# Patient Record
Sex: FEMALE | NOT HISPANIC OR LATINO | URBAN - METROPOLITAN AREA
[De-identification: names, ages, dates, MRNs, and addresses within clinical notes are randomized per-mention and may not be internally consistent; named-entity substitution may affect disease eponyms.]

---

## 2018-02-16 ENCOUNTER — INPATIENT (INPATIENT)
Facility: HOSPITAL | Age: 68
LOS: 1 days | Discharge: HOME | End: 2018-02-18
Attending: INTERNAL MEDICINE | Admitting: INTERNAL MEDICINE
Payer: MEDICARE

## 2018-02-16 VITALS
OXYGEN SATURATION: 100 % | SYSTOLIC BLOOD PRESSURE: 133 MMHG | HEART RATE: 66 BPM | DIASTOLIC BLOOD PRESSURE: 78 MMHG | RESPIRATION RATE: 16 BRPM | TEMPERATURE: 98 F | WEIGHT: 134.92 LBS | HEIGHT: 64 IN

## 2018-02-16 LAB
ALBUMIN SERPL ELPH-MCNC: 3.6 G/DL — SIGNIFICANT CHANGE UP (ref 3.3–5)
ALP SERPL-CCNC: 52 U/L — SIGNIFICANT CHANGE UP (ref 40–120)
ALT FLD-CCNC: 21 U/L — SIGNIFICANT CHANGE UP (ref 12–78)
ANION GAP SERPL CALC-SCNC: 6 MMOL/L — SIGNIFICANT CHANGE UP (ref 5–17)
APTT BLD: 25.9 SEC — LOW (ref 27.5–37.4)
AST SERPL-CCNC: 21 U/L — SIGNIFICANT CHANGE UP (ref 15–37)
BASOPHILS # BLD AUTO: 0.1 K/UL — SIGNIFICANT CHANGE UP (ref 0–0.2)
BASOPHILS NFR BLD AUTO: 1 % — SIGNIFICANT CHANGE UP (ref 0–2)
BILIRUB SERPL-MCNC: 0.2 MG/DL — SIGNIFICANT CHANGE UP (ref 0.2–1.2)
BUN SERPL-MCNC: 15 MG/DL — SIGNIFICANT CHANGE UP (ref 7–23)
CALCIUM SERPL-MCNC: 8.9 MG/DL — SIGNIFICANT CHANGE UP (ref 8.5–10.1)
CHLORIDE SERPL-SCNC: 104 MMOL/L — SIGNIFICANT CHANGE UP (ref 96–108)
CO2 SERPL-SCNC: 30 MMOL/L — SIGNIFICANT CHANGE UP (ref 22–31)
CREAT SERPL-MCNC: 0.78 MG/DL — SIGNIFICANT CHANGE UP (ref 0.5–1.3)
EOSINOPHIL # BLD AUTO: 0.2 K/UL — SIGNIFICANT CHANGE UP (ref 0–0.5)
EOSINOPHIL NFR BLD AUTO: 2.6 % — SIGNIFICANT CHANGE UP (ref 0–6)
GLUCOSE SERPL-MCNC: 109 MG/DL — HIGH (ref 70–99)
HCT VFR BLD CALC: 39.6 % — SIGNIFICANT CHANGE UP (ref 34.5–45)
HGB BLD-MCNC: 12.5 G/DL — SIGNIFICANT CHANGE UP (ref 11.5–15.5)
INR BLD: 0.95 RATIO — SIGNIFICANT CHANGE UP (ref 0.88–1.16)
LYMPHOCYTES # BLD AUTO: 1.3 K/UL — SIGNIFICANT CHANGE UP (ref 1–3.3)
LYMPHOCYTES # BLD AUTO: 18.8 % — SIGNIFICANT CHANGE UP (ref 13–44)
MCHC RBC-ENTMCNC: 25.4 PG — LOW (ref 27–34)
MCHC RBC-ENTMCNC: 31.6 GM/DL — LOW (ref 32–36)
MCV RBC AUTO: 80.6 FL — SIGNIFICANT CHANGE UP (ref 80–100)
MONOCYTES # BLD AUTO: 0.5 K/UL — SIGNIFICANT CHANGE UP (ref 0–0.9)
MONOCYTES NFR BLD AUTO: 6.9 % — SIGNIFICANT CHANGE UP (ref 2–14)
NEUTROPHILS # BLD AUTO: 4.9 K/UL — SIGNIFICANT CHANGE UP (ref 1.8–7.4)
NEUTROPHILS NFR BLD AUTO: 70.8 % — SIGNIFICANT CHANGE UP (ref 43–77)
PLATELET # BLD AUTO: 320 K/UL — SIGNIFICANT CHANGE UP (ref 150–400)
POTASSIUM SERPL-MCNC: 4.1 MMOL/L — SIGNIFICANT CHANGE UP (ref 3.5–5.3)
POTASSIUM SERPL-SCNC: 4.1 MMOL/L — SIGNIFICANT CHANGE UP (ref 3.5–5.3)
PROT SERPL-MCNC: 7.2 GM/DL — SIGNIFICANT CHANGE UP (ref 6–8.3)
PROTHROM AB SERPL-ACNC: 10.3 SEC — SIGNIFICANT CHANGE UP (ref 9.8–12.7)
RBC # BLD: 4.92 M/UL — SIGNIFICANT CHANGE UP (ref 3.8–5.2)
RBC # FLD: 12.8 % — SIGNIFICANT CHANGE UP (ref 10.3–14.5)
SODIUM SERPL-SCNC: 140 MMOL/L — SIGNIFICANT CHANGE UP (ref 135–145)
TROPONIN I SERPL-MCNC: <0.015 NG/ML — SIGNIFICANT CHANGE UP (ref 0.01–0.04)
WBC # BLD: 7 K/UL — SIGNIFICANT CHANGE UP (ref 3.8–10.5)
WBC # FLD AUTO: 7 K/UL — SIGNIFICANT CHANGE UP (ref 3.8–10.5)

## 2018-02-16 PROCEDURE — 99285 EMERGENCY DEPT VISIT HI MDM: CPT

## 2018-02-16 PROCEDURE — 71046 X-RAY EXAM CHEST 2 VIEWS: CPT | Mod: 26

## 2018-02-16 PROCEDURE — 93010 ELECTROCARDIOGRAM REPORT: CPT

## 2018-02-16 RX ORDER — ASPIRIN/CALCIUM CARB/MAGNESIUM 324 MG
325 TABLET ORAL ONCE
Qty: 0 | Refills: 0 | Status: COMPLETED | OUTPATIENT
Start: 2018-02-16 | End: 2018-02-16

## 2018-02-16 RX ORDER — SODIUM CHLORIDE 9 MG/ML
3 INJECTION INTRAMUSCULAR; INTRAVENOUS; SUBCUTANEOUS ONCE
Qty: 0 | Refills: 0 | Status: COMPLETED | OUTPATIENT
Start: 2018-02-16 | End: 2018-02-16

## 2018-02-16 RX ADMIN — Medication 325 MILLIGRAM(S): at 22:18

## 2018-02-16 RX ADMIN — SODIUM CHLORIDE 3 MILLILITER(S): 9 INJECTION INTRAMUSCULAR; INTRAVENOUS; SUBCUTANEOUS at 22:18

## 2018-02-16 NOTE — ED PROVIDER NOTE - OBJECTIVE STATEMENT
66 y/o F w/ pmhx presents to ED c/o syncope. Pt states she felt weak after going out to dinner and was told by family she lost consciousness. Pt has hx of multiple syncopal episodes with same sx, states last episode was 7 months ago and has had "a dozen" episodes over the last 40 years. Pt reports she has had cardiac cath test for syncopal episodes which was negative. Pt has no complaints at time of evaluation. 68 y/o female w/ no significant known pmhx presents to ED c/o syncope. Pt states she felt weak after going out to dinner and was told by family she lost consciousness. Pt has hx of multiple syncopal episodes with same sx, states last episode was 7 months ago and has had "a dozen" episodes over the last 40 years. No cp, sob or palpitation. No fever or chills. No rash. No melena or hematochezia. Syncope happened without a prodrome.

## 2018-02-16 NOTE — ED ADULT NURSE NOTE - CHPI ED SYMPTOMS NEG
no chills/no syncope/no diaphoresis/no shortness of breath/no vomiting/no fever/no chest pain/no dizziness/no back pain/no cough/no nausea

## 2018-02-16 NOTE — ED PROVIDER NOTE - NS_ ATTENDINGSCRIBEDETAILS _ED_A_ED_FT
I Alfredo Travis MD saw and examined the patient. Scribe documented for me and under my supervision. I have modified the scribe's documentation where necessary to reflect my history, physical exam and other relevant documentations pertinent to the care of the patient.

## 2018-02-16 NOTE — ED PROVIDER NOTE - MUSCULOSKELETAL, MLM
Spine appears normal, range of motion is not limited, no muscle or joint tenderness Spine appears normal, range of motion is not limited, no muscle or joint tenderness. 5/5 strength on flexion and extension of all muscles. No nuchal rigidity.

## 2018-02-16 NOTE — ED PROVIDER NOTE - RESPIRATORY, MLM
Breath sounds clear and equal bilaterally. Breath sounds clear and equal bilaterally. No tachypnea or retractions.

## 2018-02-16 NOTE — ED PROVIDER NOTE - MEDICAL DECISION MAKING DETAILS
Patient admitted for further inpatient evaluation for syncope without prodrome. Hospitalist care and admission of this patient is appreciated. Prior to admission I had a full discussion with patient and family and provided her with the results of labs and imaging.

## 2018-02-16 NOTE — ED PROVIDER NOTE - NEUROLOGICAL, MLM
Alert and oriented, no focal deficits, no motor or sensory deficits. Alert and oriented, no focal deficits, no motor or sensory deficits. CNs 2-12 grossly intact. EOMI. Visual fields intact x 4 quadrants. Ambulatory. No limb ataxia. NIH=0

## 2018-02-16 NOTE — ED ADULT NURSE NOTE - OBJECTIVE STATEMENT
pt is 66 yo female pmhx bilateral mastectomy presents to er via ems s/p syncopal episode.  pt states she was sitting, eating at the restaurant, passed out, denies falling to floor.

## 2018-02-17 DIAGNOSIS — Z98.890 OTHER SPECIFIED POSTPROCEDURAL STATES: Chronic | ICD-10-CM

## 2018-02-17 DIAGNOSIS — R55 SYNCOPE AND COLLAPSE: ICD-10-CM

## 2018-02-17 DIAGNOSIS — Z29.9 ENCOUNTER FOR PROPHYLACTIC MEASURES, UNSPECIFIED: ICD-10-CM

## 2018-02-17 DIAGNOSIS — E89.0 POSTPROCEDURAL HYPOTHYROIDISM: ICD-10-CM

## 2018-02-17 DIAGNOSIS — E89.0 POSTPROCEDURAL HYPOTHYROIDISM: Chronic | ICD-10-CM

## 2018-02-17 DIAGNOSIS — C50.919 MALIGNANT NEOPLASM OF UNSPECIFIED SITE OF UNSPECIFIED FEMALE BREAST: ICD-10-CM

## 2018-02-17 LAB
ALBUMIN SERPL ELPH-MCNC: 3.1 G/DL — LOW (ref 3.3–5)
ALP SERPL-CCNC: 48 U/L — SIGNIFICANT CHANGE UP (ref 40–120)
ALT FLD-CCNC: 18 U/L — SIGNIFICANT CHANGE UP (ref 12–78)
ANION GAP SERPL CALC-SCNC: 6 MMOL/L — SIGNIFICANT CHANGE UP (ref 5–17)
AST SERPL-CCNC: 15 U/L — SIGNIFICANT CHANGE UP (ref 15–37)
BILIRUB SERPL-MCNC: 0.2 MG/DL — SIGNIFICANT CHANGE UP (ref 0.2–1.2)
BUN SERPL-MCNC: 18 MG/DL — SIGNIFICANT CHANGE UP (ref 7–23)
CALCIUM SERPL-MCNC: 8.3 MG/DL — LOW (ref 8.5–10.1)
CALCIUM SERPL-MCNC: 8.3 MG/DL — LOW (ref 8.5–10.1)
CHLORIDE SERPL-SCNC: 111 MMOL/L — HIGH (ref 96–108)
CO2 SERPL-SCNC: 26 MMOL/L — SIGNIFICANT CHANGE UP (ref 22–31)
CREAT SERPL-MCNC: 0.68 MG/DL — SIGNIFICANT CHANGE UP (ref 0.5–1.3)
GLUCOSE SERPL-MCNC: 94 MG/DL — SIGNIFICANT CHANGE UP (ref 70–99)
HCV AB S/CO SERPL IA: 0.12 S/CO — SIGNIFICANT CHANGE UP
HCV AB SERPL-IMP: SIGNIFICANT CHANGE UP
INR BLD: 0.98 RATIO — SIGNIFICANT CHANGE UP (ref 0.88–1.16)
MAGNESIUM SERPL-MCNC: 1.9 MG/DL — SIGNIFICANT CHANGE UP (ref 1.6–2.6)
PHOSPHATE SERPL-MCNC: 3.9 MG/DL — SIGNIFICANT CHANGE UP (ref 2.5–4.5)
POTASSIUM SERPL-MCNC: 4.1 MMOL/L — SIGNIFICANT CHANGE UP (ref 3.5–5.3)
POTASSIUM SERPL-SCNC: 4.1 MMOL/L — SIGNIFICANT CHANGE UP (ref 3.5–5.3)
PROT SERPL-MCNC: 6.2 GM/DL — SIGNIFICANT CHANGE UP (ref 6–8.3)
PROTHROM AB SERPL-ACNC: 10.6 SEC — SIGNIFICANT CHANGE UP (ref 9.8–12.7)
SODIUM SERPL-SCNC: 143 MMOL/L — SIGNIFICANT CHANGE UP (ref 135–145)
TROPONIN I SERPL-MCNC: <0.015 NG/ML — SIGNIFICANT CHANGE UP (ref 0.01–0.04)
TSH SERPL-MCNC: 2.33 UU/ML — SIGNIFICANT CHANGE UP (ref 0.36–3.74)

## 2018-02-17 PROCEDURE — 93880 EXTRACRANIAL BILAT STUDY: CPT | Mod: 26

## 2018-02-17 PROCEDURE — 93306 TTE W/DOPPLER COMPLETE: CPT | Mod: 26

## 2018-02-17 PROCEDURE — 70450 CT HEAD/BRAIN W/O DYE: CPT | Mod: 26

## 2018-02-17 RX ORDER — ONDANSETRON 8 MG/1
4 TABLET, FILM COATED ORAL EVERY 6 HOURS
Qty: 0 | Refills: 0 | Status: DISCONTINUED | OUTPATIENT
Start: 2018-02-17 | End: 2018-02-17

## 2018-02-17 RX ORDER — HEPARIN SODIUM 5000 [USP'U]/ML
5000 INJECTION INTRAVENOUS; SUBCUTANEOUS EVERY 8 HOURS
Qty: 0 | Refills: 0 | Status: DISCONTINUED | OUTPATIENT
Start: 2018-02-17 | End: 2018-02-17

## 2018-02-17 RX ORDER — SODIUM CHLORIDE 9 MG/ML
1000 INJECTION INTRAMUSCULAR; INTRAVENOUS; SUBCUTANEOUS
Qty: 0 | Refills: 0 | Status: DISCONTINUED | OUTPATIENT
Start: 2018-02-17 | End: 2018-02-17

## 2018-02-17 RX ORDER — ACETAMINOPHEN 500 MG
650 TABLET ORAL EVERY 8 HOURS
Qty: 0 | Refills: 0 | Status: DISCONTINUED | OUTPATIENT
Start: 2018-02-17 | End: 2018-02-17

## 2018-02-17 RX ORDER — LEVOTHYROXINE SODIUM 125 MCG
25 TABLET ORAL DAILY
Qty: 0 | Refills: 0 | Status: DISCONTINUED | OUTPATIENT
Start: 2018-02-17 | End: 2018-02-18

## 2018-02-17 RX ORDER — LEVOTHYROXINE SODIUM 125 MCG
1 TABLET ORAL
Qty: 0 | Refills: 0 | COMMUNITY

## 2018-02-17 RX ADMIN — SODIUM CHLORIDE 100 MILLILITER(S): 9 INJECTION INTRAMUSCULAR; INTRAVENOUS; SUBCUTANEOUS at 05:05

## 2018-02-17 RX ADMIN — Medication 25 MICROGRAM(S): at 05:05

## 2018-02-17 NOTE — H&P ADULT - NSHPPHYSICALEXAM_GEN_ALL_CORE
Vital Signs  T(C): 36.9 (16 Feb 2018 21:05), Max: 36.9 (16 Feb 2018 21:05)  T(F): 98.4 (16 Feb 2018 21:05), Max: 98.4 (16 Feb 2018 21:05)  HR: 66 (16 Feb 2018 21:05) (66 - 66)  BP: 133/78 (16 Feb 2018 21:05) (133/78 - 133/78)  RR: 16 (16 Feb 2018 21:05) (16 - 16)  SpO2: 100% (16 Feb 2018 21:05) (100% - 100%)    PHYSICAL EXAM:  Constitutional: NAD, sleeping but easily arousable, well-developed  HEENT: EOMI, Normal Hearing, MMM  Neck: Soft and supple, No LAD  Respiratory: Breath sounds are clear bilaterally, No wheezing, rales or rhonchi  Cardiovascular: S1 and S2, regular rate and rhythm  Gastrointestinal: Bowel Sounds present, soft, nontender, nondistended, no guarding, no rebound  Extremities: No peripheral edema  Vascular: 2+ peripheral pulses  Neurological: A/O x 3, no focal deficits  Musculoskeletal: 5/5 strength b/l upper and lower extremities  Skin: No rashes

## 2018-02-17 NOTE — H&P ADULT - NSHPLABSRESULTS_GEN_ALL_CORE
12.5   7.0   )-----------( 320      ( 16 Feb 2018 21:46 )             39.6     16 Feb 2018 21:46    140    |  104    |  15     ----------------------------<  109    4.1     |  30     |  0.78     Ca    8.9        16 Feb 2018 21:46    TPro  7.2    /  Alb  3.6    /  TBili  0.2    /  DBili  x      /  AST  21     /  ALT  21     /  AlkPhos  52     16 Feb 2018 21:46    LIVER FUNCTIONS - ( 16 Feb 2018 21:46 )  Alb: 3.6 g/dL / Pro: 7.2 gm/dL / ALK PHOS: 52 U/L / ALT: 21 U/L / AST: 21 U/L / GGT: x           PT/INR - ( 16 Feb 2018 21:46 )   PT: 10.3 sec;   INR: 0.95 ratio         PTT - ( 16 Feb 2018 21:46 )  PTT:25.9 sec  CAPILLARY BLOOD GLUCOSE    CARDIAC MARKERS ( 17 Feb 2018 01:10 )  <0.015 ng/mL / x     / x     / x     / x      CARDIAC MARKERS ( 16 Feb 2018 21:46 )  <0.015 ng/mL / x     / x     / x     / x 12.5   7.0   )-----------( 320      ( 16 Feb 2018 21:46 )             39.6     16 Feb 2018 21:46    140    |  104    |  15     ----------------------------<  109    4.1     |  30     |  0.78     Ca    8.9        16 Feb 2018 21:46    TPro  7.2    /  Alb  3.6    /  TBili  0.2    /  DBili  x      /  AST  21     /  ALT  21     /  AlkPhos  52     16 Feb 2018 21:46    LIVER FUNCTIONS - ( 16 Feb 2018 21:46 )  Alb: 3.6 g/dL / Pro: 7.2 gm/dL / ALK PHOS: 52 U/L / ALT: 21 U/L / AST: 21 U/L / GGT: x           PT/INR - ( 16 Feb 2018 21:46 )   PT: 10.3 sec;   INR: 0.95 ratio       PTT - ( 16 Feb 2018 21:46 )  PTT:25.9 sec  CAPILLARY BLOOD GLUCOSE    CARDIAC MARKERS ( 17 Feb 2018 01:10 )  <0.015 ng/mL / x     / x     / x     / x      CARDIAC MARKERS ( 16 Feb 2018 21:46 )  <0.015 ng/mL / x     / x     / x     / x      CXR:    EKG:

## 2018-02-17 NOTE — CONSULT NOTE ADULT - SUBJECTIVE AND OBJECTIVE BOX
Patient is a 67y old  Female who presents with a chief complaint of I passed out.    HPI:  68yo F w/hx of Breast cancerx2 s/p chemo& radiation and double mastectomy present after a witnessed syncopal episode. Per pt, she passed out after dinner last night and was told by family that it lasted for about a minute. She denies any inciting event (emotional/physical exertion), did not feel N/V, flushing, palpitations, dizziness prior to syncope. Did not have any seizure-like activity loss of bladder/bowel function during her syncope. When she came to, she did not feel confused, have a HA, CP, palpitations or SOB. States weakness and dizziness after coming to. She also states that an ambulance was called this time because she felt weaker than normal and the weakness lasted longer than it normally does. States did not hit her head per witnesses.    Per pt, she has had multiple syncopal episodes over the years. The episodes occur after heavy meals, +aura prior to syncope, she states that she can feel a syncopal episode coming on most of the time prior to its occurrence.  She's normally sitting down when she syncopizes.    States she felt nauseated and vomited on the ambulance.   Currently, pt denies weakness, dizziness, HA, CP, palpitations, SOB, N/V/D/C, numbness/tingling in her extremities    She was given 325mg of Aspirin in the ED.    She denies any more symptoms since presentation.     PMHX: breast cancer  PSHx; Thyroidectomy  Social hx: lives at home w/her . Denies ETOH, cigarettes, illicit drugs  Family hx: heart disease, MI, diabetes, obesity (17 Feb 2018 02:38)      PAST MEDICAL & SURGICAL HISTORY:  Thyroid nodule  Breast cancer  H/O bilateral mastectomy  H/O partial thyroidectomy      MEDICATIONS  (STANDING):  heparin  Injectable 5000 Unit(s) SubCutaneous every 8 hours  levothyroxine 25 MICROGram(s) Oral daily  sodium chloride 0.9%. 1000 milliLiter(s) (100 mL/Hr) IV Continuous <Continuous>    MEDICATIONS  (PRN):  acetaminophen   Tablet. 650 milliGRAM(s) Oral every 8 hours PRN Mild Pain (1 - 3)  ondansetron Injectable 4 milliGRAM(s) IV Push every 6 hours PRN Nausea      FAMILY HISTORY:  Family history of diabetes mellitus in mother (Mother)  Family history of obesity (Mother)  Family history of heart disease (Father, Mother)  Family history of myocardial infarction (Father, Mother)      SOCIAL HISTORY:  non smoker, no alcohol use     REVIEW OF SYSTEMS:  CONSTITUTIONAL:  No night sweats.  No fatigue, malaise, lethargy.  No fever or chills.  HEENT:  Eyes:  No visual changes.  No eye pain.      ENT:  No runny nose.  No epistaxis.  No sinus pain.  No sore throat.  No odynophagia.  No ear pain.  No congestion.  RESPIRATORY:  No cough.  No wheeze.  No hemoptysis.  No shortness of breath.  CARDIOVASCULAR:  No chest pains.  No palpitations. No shortness of breath, No orthopnea or PND, c/o syncope.  GASTROINTESTINAL:  No abdominal pain.  No nausea or vomiting.  No diarrhea or constipation.  No hematemesis.  No hematochezia.  No melena.  GENITOURINARY:  No urgency.  No frequency.  No dysuria.  No hematuria.  No obstructive symptoms.  No discharge.  No pain.  No significant abnormal bleeding.  MUSCULOSKELETAL:  No musculoskeletal pain.  No joint swelling.  No arthritis.  NEUROLOGICAL:  No tingling or numbness or weakness.  PSYCHIATRIC:  No confusion  SKIN:  No rashes.  No lesions.  No wounds.  ENDOCRINE:  No unexplained weight loss.  No polydipsia.  No polyuria.  No polyphagia.  HEMATOLOGIC:  No anemia.  No purpura.  No petechiae.  No prolonged or excessive bleeding.   ALLERGIC AND IMMUNOLOGIC:  No pruritus.  No swelling.         Vital Signs Last 24 Hrs  T(C): 36.3 (17 Feb 2018 05:17), Max: 36.9 (16 Feb 2018 21:05)  T(F): 97.4 (17 Feb 2018 05:17), Max: 98.4 (16 Feb 2018 21:05)  HR: 60 (17 Feb 2018 05:17) (60 - 80)  BP: 108/63 (17 Feb 2018 05:17) (108/63 - 133/78)  BP(mean): --  RR: 16 (17 Feb 2018 05:17) (16 - 16)  SpO2: 98% (17 Feb 2018 03:27) (98% - 100%)    PHYSICAL EXAM-    Constitutional: The patient appears to be normal, well developed, well nourished and alert and oriented to time, place and person. The patient does not appear acutely ill.     Head: Head is normocephalic and atraumatic.      Neck: The patient's neck is supple without enlargement, has no palpable thyromegaly nor thyroid nodules and has no jugular venous distention. No audible carotid bruits. There are strong carotid pulses bilaterally. No JVD.     Cardiovascular: Regular rate and rhythm without S3, S4. No murmurs or rubs are appreciated.      Respiratory: Breath sounds are normal. No rales. No wheezing.    Abdomen: Soft, nontender, nondistended with positive bowel sounds.      Extremity: No tenderness. No  pitting edema No skin discoloration No clubbing No cyanosis.     Neurologic: The patient is alert and oriented.      Skin: No rash, no obvious lesions noted.      Psychiatric: The patient appears to be emotionally stable.      INTERPRETATION OF TELEMETRY: sinus rythm    ECG:sinus rythm ,normal axis, T wave inversion in aVL and V2.     I&O's Detail      LABS:                        12.5   7.0   )-----------( 320      ( 16 Feb 2018 21:46 )             39.6     02-17    143  |  111<H>  |  18  ----------------------------<  94  4.1   |  26  |  0.68    Ca    8.3<L>      17 Feb 2018 06:09  Phos  3.9     02-17  Mg     1.9     02-17    TPro  6.2  /  Alb  3.1<L>  /  TBili  0.2  /  DBili  x   /  AST  15  /  ALT  18  /  AlkPhos  48  02-17    CARDIAC MARKERS ( 17 Feb 2018 01:10 )  <0.015 ng/mL / x     / x     / x     / x      CARDIAC MARKERS ( 16 Feb 2018 21:46 )  <0.015 ng/mL / x     / x     / x     / x          PT/INR - ( 17 Feb 2018 06:09 )   PT: 10.6 sec;   INR: 0.98 ratio         PTT - ( 16 Feb 2018 21:46 )  PTT:25.9 sec    I&O's Summary    BNP  RADIOLOGY & ADDITIONAL STUDIES:

## 2018-02-17 NOTE — H&P ADULT - NSHPREVIEWOFSYSTEMS_GEN_ALL_CORE
REVIEW OF SYSTEMS:  CONSTITUTIONAL: No weakness, No fevers or chills  EYES/ENT: No visual changes;  No vertigo or throat pain   NECK: No pain or stiffness  RESPIRATORY: No cough, wheezing, hemoptysis; No shortness of breath  CARDIOVASCULAR: No chest pain or palpitations  GASTROINTESTINAL: No abdominal or epigastric pain. No nausea, vomiting, or hematemesis; No diarrhea or constipation. No melena or hematochezia.  GENITOURINARY: No dysuria, frequency or hematuria  HEMATOLOGIC: No easy bruising, prolonged bleeding from everyday cuts,   NEUROLOGICAL: No numbness or weakness  PSYCH: Denies Insomnia, loss of interest in activities, feelings of guilt, decreased energy, difficulty concentrating, decreased appetite, SI/HI  SKIN: No itching, burning, rashes, or lesions   All other review of systems is negative unless indicated above

## 2018-02-17 NOTE — H&P ADULT - FAMILY HISTORY
Father  Still living? No  Family history of myocardial infarction, Age at diagnosis: Age Unknown  Family history of heart disease, Age at diagnosis: Age Unknown     Mother  Still living? No  Family history of myocardial infarction, Age at diagnosis: Age Unknown  Family history of heart disease, Age at diagnosis: Age Unknown  Family history of obesity, Age at diagnosis: Age Unknown  Family history of diabetes mellitus in mother, Age at diagnosis: Age Unknown

## 2018-02-17 NOTE — H&P ADULT - ATTENDING COMMENTS
68yo F w/hx of Breast cancerx2 s/p chemo& radiation and double mastectomy present after a witnessed syncopal episode. Per pt, she passed out after dinner last night and was told by family that it lasted for about a minute. She denies any inciting event (emotional/physical exertion), did not feel N/V, flushing, palpitations, dizziness prior to syncope. Did not have any seizure-like activity loss of bladder/bowel function during her syncope.     Per pt, she has had multiple syncopal episodes over the years. The episodes occur after heavy meals, +aura prior to syncope, she states that she can feel a syncopal episode coming on most of the time prior to its occurrence.  She's normally sitting down when she has syncope.    On exam:    Constitutional: NAD, sleeping but easily arousable, well-developed  HEENT: EOMI, Normal Hearing, MMM  Neck: Soft and supple, No LAD  Respiratory: Breath sounds are clear bilaterally, No wheezing, rales or rhonchi  Cardiovascular: S1 and S2, regular rate and rhythm  Gastrointestinal: Bowel Sounds present, soft, nontender, nondistended, no guarding, no rebound  Extremities: No peripheral edema  Vascular: 2+ peripheral pulses  Neurological: A/O x 3, no focal deficits, sensory-intact, cerebellum-intact, CF-DF-Dvc-intact   Musculoskeletal: 5/5 strength b/l upper and lower extremities    Labs and imaging are reviewed.    A/P:    Syncope  Hypothyroidism  Orthostatic Hypotension   -will follow clinically on Telemetry   -get CT head, carotid duplex, Echo  -will get cardiology consult for possible Loop recorder as she has it so many times.   -will give IVF for mild Orthostatic hypotension   -continue levothyroxine  Heparin for DVT px

## 2018-02-17 NOTE — H&P ADULT - ASSESSMENT
68yo F w/hx of Breast cancerx2 s/p chemo& radiation and double mastectomy present after syncopal episode.

## 2018-02-17 NOTE — H&P ADULT - PROBLEM SELECTOR PLAN 1
- # Most likely neurally mediated in the setting of postprandial onset.  - Admit to Tele  - Fall precautions  - orthostatic vitals  - ECHO to r/o structural heart disease  - Urine toxicology  - Consider Head CT in the setting of previous episodes of syncope- there can be ?head trauma # Most likely neurally mediated in the setting of postprandial onset.  - Admit to Tele  - Fall precautions  - orthostatic vitals  - ECHO to r/o structural heart disease  - Urine toxicology  - Consider Head CT in the setting of previous episodes of syncope- there can be ?head trauma  - Carotid doppler  - Cardio consult for loop recorder eval

## 2018-02-17 NOTE — H&P ADULT - HISTORY OF PRESENT ILLNESS
66yo F w/hx of Breast cancerx2 s/p chemo& radiation and double mastectomy present after syncope. Per pt, she passed out after dinner last night and was told by family that it lasted for about a minute. She denies any inciting event, did not feel N/V, flushing, palpitations, dizziness prior to syncope. Did not have any seizure-like activity loss of bladder/bowel function during her syncope. When she came to, she did not feel confused, have a HA, CP, palpitations or SOB. States weakness and dizziness after coming to. Did not hit her head when she syncopized. She also states that an ambulance was called this time because she felt weaker than normal and the weakness lasted longer than it normally does.    Per pt, she has had multiple syncopal episodes over the years. The episodes occur after heavy meals, although she has no inciting sx, she states that she can feel a syncopal episode coming on most of the time prior to its occurrence.      States she felt nauseated and vomited on the ambulance.   Currently, pt denies weakness, dizziness, HA, CP, palpitations, SOB, N/V/D/C, numbness/tingling in he extremities    PMHX: breast cancer  PSHx; Thyroidectomy  Social hx: lives at home w/her . Denies ETOH, cigarettes, illicit drugs  Family hx: heart disease, MI, diabetes, obesity 68yo F w/hx of Breast cancerx2 s/p chemo& radiation and double mastectomy present after syncopal episode. Per pt, she passed out after dinner last night and was told by family that it lasted for about a minute. She denies any inciting event, did not feel N/V, flushing, palpitations, dizziness prior to syncope. Did not have any seizure-like activity loss of bladder/bowel function during her syncope. When she came to, she did not feel confused, have a HA, CP, palpitations or SOB. States weakness and dizziness after coming to. Did not hit her head when she syncopized. She also states that an ambulance was called this time because she felt weaker than normal and the weakness lasted longer than it normally does.    Per pt, she has had multiple syncopal episodes over the years. The episodes occur after heavy meals, although she has no inciting sx, she states that she can feel a syncopal episode coming on most of the time prior to its occurrence.      States she felt nauseated and vomited on the ambulance.   Currently, pt denies weakness, dizziness, HA, CP, palpitations, SOB, N/V/D/C, numbness/tingling in he extremities    PMHX: breast cancer  PSHx; Thyroidectomy  Social hx: lives at home w/her . Denies ETOH, cigarettes, illicit drugs  Family hx: heart disease, MI, diabetes, obesity 68yo F w/hx of Breast cancerx2 s/p chemo& radiation and double mastectomy present after a witnessed syncopal episode. Per pt, she passed out after dinner last night and was told by family that it lasted for about a minute. She denies any inciting event (emotional/physical exertion), did not feel N/V, flushing, palpitations, dizziness prior to syncope. Did not have any seizure-like activity loss of bladder/bowel function during her syncope. When she came to, she did not feel confused, have a HA, CP, palpitations or SOB. States weakness and dizziness after coming to. She also states that an ambulance was called this time because she felt weaker than normal and the weakness lasted longer than it normally does.    Per pt, she has had multiple syncopal episodes over the years. The episodes occur after heavy meals, although she has no inciting sx, she states that she can feel a syncopal episode coming on most of the time prior to its occurrence.      States she felt nauseated and vomited on the ambulance.   Currently, pt denies weakness, dizziness, HA, CP, palpitations, SOB, N/V/D/C, numbness/tingling in her extremities    PMHX: breast cancer  PSHx; Thyroidectomy  Social hx: lives at home w/her . Denies ETOH, cigarettes, illicit drugs  Family hx: heart disease, MI, diabetes, obesity 68yo F w/hx of Breast cancerx2 s/p chemo& radiation and double mastectomy present after a witnessed syncopal episode. Per pt, she passed out after dinner last night and was told by family that it lasted for about a minute. She denies any inciting event (emotional/physical exertion), did not feel N/V, flushing, palpitations, dizziness prior to syncope. Did not have any seizure-like activity loss of bladder/bowel function during her syncope. When she came to, she did not feel confused, have a HA, CP, palpitations or SOB. States weakness and dizziness after coming to. She also states that an ambulance was called this time because she felt weaker than normal and the weakness lasted longer than it normally does. States did not hit her head per witnesses.    Per pt, she has had multiple syncopal episodes over the years. The episodes occur after heavy meals, +aura prior to synco, she states that she can feel a syncopal episode coming on most of the time prior to its occurrence.  She's normally sitting down when she syncopizes.    States she felt nauseated and vomited on the ambulance.   Currently, pt denies weakness, dizziness, HA, CP, palpitations, SOB, N/V/D/C, numbness/tingling in her extremities    PMHX: breast cancer  PSHx; Thyroidectomy  Social hx: lives at home w/her . Denies ETOH, cigarettes, illicit drugs  Family hx: heart disease, MI, diabetes, obesity 68yo F w/hx of Breast cancerx2 s/p chemo& radiation and double mastectomy present after a witnessed syncopal episode. Per pt, she passed out after dinner last night and was told by family that it lasted for about a minute. She denies any inciting event (emotional/physical exertion), did not feel N/V, flushing, palpitations, dizziness prior to syncope. Did not have any seizure-like activity loss of bladder/bowel function during her syncope. When she came to, she did not feel confused, have a HA, CP, palpitations or SOB. States weakness and dizziness after coming to. She also states that an ambulance was called this time because she felt weaker than normal and the weakness lasted longer than it normally does. States did not hit her head per witnesses.    Per pt, she has had multiple syncopal episodes over the years. The episodes occur after heavy meals, +aura prior to syncooe, she states that she can feel a syncopal episode coming on most of the time prior to its occurrence.  She's normally sitting down when she syncopizes.    States she felt nauseated and vomited on the ambulance.   Currently, pt denies weakness, dizziness, HA, CP, palpitations, SOB, N/V/D/C, numbness/tingling in her extremities    PMHX: breast cancer  PSHx; Thyroidectomy  Social hx: lives at home w/her . Denies ETOH, cigarettes, illicit drugs  Family hx: heart disease, MI, diabetes, obesity 66yo F w/hx of Breast cancerx2 s/p chemo& radiation and double mastectomy present after a witnessed syncopal episode. Per pt, she passed out after dinner last night and was told by family that it lasted for about a minute. She denies any inciting event (emotional/physical exertion), did not feel N/V, flushing, palpitations, dizziness prior to syncope. Did not have any seizure-like activity loss of bladder/bowel function during her syncope. When she came to, she did not feel confused, have a HA, CP, palpitations or SOB. States weakness and dizziness after coming to. She also states that an ambulance was called this time because she felt weaker than normal and the weakness lasted longer than it normally does. States did not hit her head per witnesses.    Per pt, she has had multiple syncopal episodes over the years. The episodes occur after heavy meals, +aura prior to syncope, she states that she can feel a syncopal episode coming on most of the time prior to its occurrence.  She's normally sitting down when she syncopizes.    States she felt nauseated and vomited on the ambulance.   Currently, pt denies weakness, dizziness, HA, CP, palpitations, SOB, N/V/D/C, numbness/tingling in her extremities    PMHX: breast cancer  PSHx; Thyroidectomy  Social hx: lives at home w/her . Denies ETOH, cigarettes, illicit drugs  Family hx: heart disease, MI, diabetes, obesity 66yo F w/hx of Breast cancerx2 s/p chemo& radiation and double mastectomy present after a witnessed syncopal episode. Per pt, she passed out after dinner last night and was told by family that it lasted for about a minute. She denies any inciting event (emotional/physical exertion), did not feel N/V, flushing, palpitations, dizziness prior to syncope. Did not have any seizure-like activity loss of bladder/bowel function during her syncope. When she came to, she did not feel confused, have a HA, CP, palpitations or SOB. States weakness and dizziness after coming to. She also states that an ambulance was called this time because she felt weaker than normal and the weakness lasted longer than it normally does. States did not hit her head per witnesses.    Per pt, she has had multiple syncopal episodes over the years. The episodes occur after heavy meals, +aura prior to syncope, she states that she can feel a syncopal episode coming on most of the time prior to its occurrence.  She's normally sitting down when she syncopizes.    States she felt nauseated and vomited on the ambulance.   Currently, pt denies weakness, dizziness, HA, CP, palpitations, SOB, N/V/D/C, numbness/tingling in her extremities    She was given 325mg of Aspirin in the ED.    PMHX: breast cancer  PSHx; Thyroidectomy  Social hx: lives at home w/her . Denies ETOH, cigarettes, illicit drugs  Family hx: heart disease, MI, diabetes, obesity

## 2018-02-17 NOTE — CONSULT NOTE ADULT - ASSESSMENT
Syncope- sounds vasovagal in origin.  No events on telemetry so far.  Will check 2 D echo this am.  Continue telemetry.  Check orthostatic BP.  Pt getting CT scan this am.    Other medical issues- Management per primary team.   Thank you for allowing me to participate in the care of this patient. Please feel free to contact me with any questions.

## 2018-02-17 NOTE — PROGRESS NOTE ADULT - SUBJECTIVE AND OBJECTIVE BOX
CHIEF COMPLAINT: "I passed out"    HPI: HPI:  68yo F w/hx of Breast cancerx2 s/p chemo& radiation and double mastectomy present after a witnessed syncopal episode. Per pt, she passed out after dinner last night and was told by family that it lasted for about a minute. She denies any inciting event (emotional/physical exertion), did not feel N/V, flushing, palpitations, dizziness prior to syncope. Did not have any seizure-like activity loss of bladder/bowel function during her syncope. When she came to, she did not feel confused, have a HA, CP, palpitations or SOB. States weakness and dizziness after coming to. She also states that an ambulance was called this time because she felt weaker than normal and the weakness lasted longer than it normally does. States did not hit her head per witnesses.    Per pt, she has had multiple syncopal episodes over the years. The episodes occur after heavy meals, +aura prior to syncope, she states that she can feel a syncopal episode coming on most of the time prior to its occurrence.  She's normally sitting down when she syncopizes.    States she felt nauseated and vomited on the ambulance.   Currently, pt denies weakness, dizziness, HA, CP, palpitations, SOB, N/V/D/C, numbness/tingling in her extremities    She was given 325mg of Aspirin in the ED.    2/17/18: Pt was seen and examined at bedside.  was also at bedside. Pt and  report she passed out after having large meal in a restaurant and had LOC for about 2mins. Pt notes that she has experienced these syncopal episodes for the last 40 years and they tend to occur after having a very large meal, however there are times when she does not have an event. She reports that she was seen by a cardiologist in the past and was found to experience a "skipped beat". She has not had any additional workup since and dos not have an outpatient cardiologist. Pt has no acute complaints currently and denies any dizziness, lightheadedness, CP , or palpitations.    REVIEW OF SYSTEMS:  CONSTITUTIONAL: No weakness, fevers or chills  EYES/ENT: No visual changes;  No vertigo or throat pain   NECK: No pain or stiffness  RESPIRATORY: No cough, wheezing, hemoptysis; No shortness of breath  CARDIOVASCULAR: No chest pain or palpitations  GASTROINTESTINAL: No abdominal or epigastric pain. No nausea, vomiting, or hematemesis; No diarrhea or constipation. No melena or hematochezia.  GENITOURINARY: No dysuria, frequency or hematuria  NEUROLOGICAL: No numbness or weakness  SKIN: No itching, burning, rashes, or lesions   All other review of systems is negative unless indicated above    Vital Signs Last 24 Hrs  T(C): 36.3 (17 Feb 2018 05:17), Max: 36.9 (16 Feb 2018 21:05)  T(F): 97.4 (17 Feb 2018 05:17), Max: 98.4 (16 Feb 2018 21:05)  HR: 60 (17 Feb 2018 05:17) (60 - 80)  BP: 108/63 (17 Feb 2018 05:17) (108/63 - 133/78)  RR: 16 (17 Feb 2018 05:17) (16 - 16)  SpO2: 98% (17 Feb 2018 03:27) (98% - 100%)      PHYSICAL EXAM:  Constitutional: NAD, awake and alert, well-developed  HEENT: PERR, EOMI, Normal Hearing, MMM  Neck: Soft and supple, No LAD, No JVD  Respiratory: Breath sounds are clear bilaterally, No wheezing, rales or rhonchi  Cardiovascular: S1 and S2, regular rate and rhythm, no Murmurs, gallops or rubs  Gastrointestinal: Bowel Sounds present, soft, nontender, nondistended, no guarding, no rebound  Extremities: No peripheral edema  Vascular: 2+ peripheral pulses  Neurological: A/O x 3, no focal deficits  Musculoskeletal: 5/5 strength b/l upper and lower extremities  Skin: No rashes    MEDICATIONS:  MEDICATIONS  (STANDING):  heparin  Injectable 5000 Unit(s) SubCutaneous every 8 hours  levothyroxine 25 MICROGram(s) Oral daily  sodium chloride 0.9%. 1000 milliLiter(s) (100 mL/Hr) IV Continuous <Continuous>    MEDICATIONS  (PRN):  acetaminophen   Tablet. 650 milliGRAM(s) Oral every 8 hours PRN Mild Pain (1 - 3)  ondansetron Injectable 4 milliGRAM(s) IV Push every 6 hours PRN Nausea        LABS: All Labs Reviewed:                        12.5   7.0   )-----------( 320      ( 16 Feb 2018 21:46 )             39.6     02-17    143  |  111<H>  |  18  ----------------------------<  94  4.1   |  26  |  0.68    Ca    8.3<L>      17 Feb 2018 06:09  Phos  3.9     02-17  Mg     1.9     02-17    TPro  6.2  /  Alb  3.1<L>  /  TBili  0.2  /  DBili  x   /  AST  15  /  ALT  18  /  AlkPhos  48  02-17    PT/INR - ( 17 Feb 2018 06:09 )   PT: 10.6 sec;   INR: 0.98 ratio         PTT - ( 16 Feb 2018 21:46 )  PTT:25.9 sec  CARDIAC MARKERS ( 17 Feb 2018 01:10 )  <0.015 ng/mL / x     / x     / x     / x      CARDIAC MARKERS ( 16 Feb 2018 21:46 )  <0.015 ng/mL / x     / x     / x     / x          RADIOLOGY/EKG:  < from: 12 Lead ECG (02.16.18 @ 21:06) >  Diagnosis Line Normal sinus rhythm  Anterior infarct , age undetermined  Abnormal ECG  No previous ECGs available  Confirmed by SULEIMAN TIERNEY, KIANNA SOLIZ ([id number]) on 2/17/2018 10:48:49 AM      < from: Xray Chest 2 Views PA/Lat (02.16.18 @ 22:33) >  IMPRESSION:    Normal chest      < from: CT Head No Cont (02.17.18 @ 08:11) >  Impression:     No intracranial hemorrhage, mass effect or CT evidence of acute infarct      < from: US Duplex Carotid Arteries Complete, Bilateral (02.17.18 @ 08:21) >  IMPRESSION:      No hemodynamic significant stenosis bilaterally        DVT PPX: Heparin SQ    ADVANCED DIRECTIVE:

## 2018-02-17 NOTE — PROGRESS NOTE ADULT - PROBLEM SELECTOR PLAN 1
Head CT on admission unremarkable.  Carotid dopplers performed 2/17/18- unremarkable  - Tele monitoring  - Fall precautions  - F/U Orthostatic Vitals  - F/U Echo   - F/U Cardio consult for loop recorder evaluation

## 2018-02-17 NOTE — PROGRESS NOTE ADULT - SUBJECTIVE AND OBJECTIVE BOX
Chief Complaint/Reason for Admission: I passed out	  History of Present Illness: 	  66yo F w/hx of Breast cancerx2 s/p chemo& radiation and double mastectomy present after a witnessed syncopal episode. Per pt, she passed out after dinner last night and was told by family that it lasted for about a minute. She denies any inciting event (emotional/physical exertion), did not feel N/V, flushing, palpitations, dizziness prior to syncope. Did not have any seizure-like activity loss of bladder/bowel function during her syncope. When she came to, she did not feel confused, have a HA, CP, palpitations or SOB. States weakness and dizziness after coming to. She also states that an ambulance was called this time because she felt weaker than normal and the weakness lasted longer than it normally does. States did not hit her head per witnesses.    Per pt, she has had multiple syncopal episodes over the years. The episodes occur after heavy meals, +aura prior to syncope, she states that she can feel a syncopal episode coming on most of the time prior to its occurrence.  She's normally sitting down when she syncopizes.      Review of Systems: REVIEW OF SYSTEMS:  CONSTITUTIONAL: No weakness, No fevers or chills  EYES/ENT: No visual changes;  No vertigo or throat pain   NECK: No pain or stiffness  RESPIRATORY: No cough, wheezing, hemoptysis; No shortness of breath  CARDIOVASCULAR: No chest pain or palpitations  GASTROINTESTINAL: No abdominal or epigastric pain. No nausea, vomiting, or hematemesis; No diarrhea or constipation. No melena or hematochezia.  GENITOURINARY: No dysuria, frequency or hematuria  HEMATOLOGIC: No easy bruising, prolonged bleeding from everyday cuts,   NEUROLOGICAL: No numbness or weakness  PSYCH: Denies Insomnia, loss of interest in activities, feelings of guilt, decreased energy, difficulty concentrating, decreased appetite, SI/HI  SKIN: No itching, burning, rashes, or lesions  All other review of systems is negative unless indicated above	        On exam:  Constitutional: NAD, sleeping but easily arousable, well-developed  HEENT: EOMI, Normal Hearing, MMM  Neck: Soft and supple, No LAD  Respiratory: Breath sounds are clear bilaterally, No wheezing, rales or rhonchi  Cardiovascular: S1 and S2, regular rate and rhythm  Gastrointestinal: Bowel Sounds present, soft, nontender, nondistended, no guarding, no rebound  Extremities: No peripheral edema  Vascular: 2+ peripheral pulses  Neurological: A/O x 3, no focal deficits, sensory-intact, cerebellum-intact, JA-WR-Xvj-intact   Musculoskeletal: 5/5 strength b/l upper and lower extremities    Syncope  -vasovagal in character  - pending TTE  - check orthostatics  Hypothyroidism  Orthostatic Hypotension   -will follow clinically on Telemetry   -get CT head, carotid duplex, Echo  -will get cardiology consult for possible Loop recorder as she has it so many times.   -will give IVF for mild Orthostatic hypotension   -continue levothyroxine  Heparin for DVT px . Chief Complaint/Reason for Admission: I passed out	  History of Present Illness: 	  68yo F w/hx of Breast cancerx2 s/p chemo& radiation and double mastectomy present after a witnessed syncopal episode. Per pt, she passed out after dinner last night and was told by family that it lasted for about a minute. She denies any inciting event (emotional/physical exertion), did not feel N/V, flushing, palpitations, dizziness prior to syncope. Did not have any seizure-like activity loss of bladder/bowel function during her syncope. When she came to, she did not feel confused, have a HA, CP, palpitations or SOB. States weakness and dizziness after coming to. She also states that an ambulance was called this time because she felt weaker than normal and the weakness lasted longer than it normally does. States did not hit her head per witnesses.    Per pt, she has had multiple syncopal episodes over the years. The episodes occur after heavy meals, +aura prior to syncope, she states that she can feel a syncopal episode coming on most of the time prior to its occurrence.  She's normally sitting down when she syncopizes.    2/17: seen and eval. hemodyanmcially stable. Denies any HA, CP, SOB. No fevers, chills or shakes. States that she has been suffering with passing out episodes for 40 years now -  similar in nature. Had workup done in [place], with tilt table test. Patient states that she was told that she might have had some skipped beats. care discussed with Dr. Cardenas - she wants to continue to monitor pt.         Review of Systems: REVIEW OF SYSTEMS:  CONSTITUTIONAL: No weakness, No fevers or chills  EYES/ENT: No visual changes;  No vertigo or throat pain   NECK: No pain or stiffness  RESPIRATORY: No cough, wheezing, hemoptysis; No shortness of breath  CARDIOVASCULAR: No chest pain or palpitations  GASTROINTESTINAL: No abdominal or epigastric pain. No nausea, vomiting, or hematemesis; No diarrhea or constipation. No melena or hematochezia.  GENITOURINARY: No dysuria, frequency or hematuria  HEMATOLOGIC: No easy bruising, prolonged bleeding from everyday cuts,   NEUROLOGICAL: No numbness or weakness  PSYCH: Denies Insomnia, loss of interest in activities, feelings of guilt, decreased energy, difficulty concentrating, decreased appetite, SI/HI  SKIN: No itching, burning, rashes, or lesions  All other review of systems is negative unless indicated above	      On exam:  Constitutional: NAD, sleeping but easily arousable, well-developed  HEENT: EOMI, Normal Hearing, MMM  Neck: Soft and supple, No LAD  Respiratory: Breath sounds are clear bilaterally, No wheezing, rales or rhonchi  Cardiovascular: S1 and S2, regular rate and rhythm  Gastrointestinal: Bowel Sounds present, soft, nontender, nondistended, no guarding, no rebound  Extremities: No peripheral edema  Vascular: 2+ peripheral pulses  Neurological: A/O x 3, no focal deficits, sensory-intact, cerebellum-intact, UB-PO-Foz-intact   Musculoskeletal: 5/5 strength b/l upper and lower extremities    labs:   CBC Full  -  ( 16 Feb 2018 21:46 )  WBC Count : 7.0 K/uL  Hemoglobin : 12.5 g/dL  Hematocrit : 39.6 %  Platelet Count - Automated : 320 K/uL    PT/INR - ( 17 Feb 2018 06:09 )   PT: 10.6 sec;   INR: 0.98 ratio         PTT - ( 16 Feb 2018 21:46 )  PTT:25.9 sec    02-17    143  |  111<H>  |  18  ----------------------------<  94  4.1   |  26  |  0.68    Ca    8.3<L>      17 Feb 2018 06:09  Phos  3.9     02-17  Mg     1.9     02-17    TPro  6.2  /  Alb  3.1<L>  /  TBili  0.2  /  DBili  x   /  AST  15  /  ALT  18  /  AlkPhos  48  02-17      Syncope  - vasovagal in character  - official TTE pending  - check orthostatics  - Hypothyroidism  - will follow clinically on Telemetry   - get CT head, carotid duplex, Echo  - will get cardiology consult for possible Loop recorder as she has it so many times.   - continue levothyroxine  - encourage ambulation Chief Complaint/Reason for Admission: I passed out	  History of Present Illness: 	  68yo F w/hx of Breast cancerx2 s/p chemo& radiation and double mastectomy present after a witnessed syncopal episode. Per pt, she passed out after dinner last night and was told by family that it lasted for about 2 min. She denies any inciting event (emotional/physical exertion), did not feel N/V, flushing, palpitations, dizziness prior to syncope. Did not have any seizure-like activity loss of bladder/bowel function during her syncope. When she came to, she did not feel confused, have a HA, CP, palpitations or SOB. States weakness and dizziness after coming to. She also states that an ambulance was called this time because she felt weaker than normal and the weakness lasted longer than it normally does. States did not hit her head per witnesses.    Per pt, she has had multiple syncopal episodes over the years. The episodes occur after heavy meals, +aura prior to syncope, she states that she can feel a syncopal episode coming on most of the time prior to its occurrence.  She's normally sitting down when she syncopizes.    2/17: seen and eval. hemodyanmcially stable. Denies any HA, CP, SOB. No fevers, chills or shakes. States that she has been suffering with passing out episodes for 40 years now -  similar in nature. Had workup done in [place], with tilt table test. Patient states that she was told that she might have had some skipped beats. care discussed with Dr. Cardenas - she wants to continue to monitor pt.       Review of Systems: REVIEW OF SYSTEMS:  CONSTITUTIONAL: No weakness, No fevers or chills  EYES/ENT: No visual changes;  No vertigo or throat pain   NECK: No pain or stiffness  RESPIRATORY: No cough, wheezing, hemoptysis; No shortness of breath  CARDIOVASCULAR: No chest pain or palpitations  GASTROINTESTINAL: No abdominal or epigastric pain. No nausea, vomiting, or hematemesis; No diarrhea or constipation. No melena or hematochezia.  GENITOURINARY: No dysuria, frequency or hematuria  HEMATOLOGIC: No easy bruising, prolonged bleeding from everyday cuts,   NEUROLOGICAL: No numbness or weakness  PSYCH: Denies Insomnia, loss of interest in activities, feelings of guilt, decreased energy, difficulty concentrating, decreased appetite, SI/HI  SKIN: No itching, burning, rashes, or lesions  All other review of systems is negative unless indicated above	      On exam:  Constitutional: NAD, sleeping but easily arousable, well-developed  HEENT: EOMI, Normal Hearing, MMM  Neck: Soft and supple, No LAD  Respiratory: Breath sounds are clear bilaterally, No wheezing, rales or rhonchi  Cardiovascular: S1 and S2, regular rate and rhythm  Gastrointestinal: Bowel Sounds present, soft, nontender, nondistended, no guarding, no rebound  Extremities: No peripheral edema  Vascular: 2+ peripheral pulses  Neurological: A/O x 3, no focal deficits, sensory-intact, cerebellum-intact, TQ-IJ-Wzx-intact   Musculoskeletal: 5/5 strength b/l upper and lower extremities    labs:   CBC Full  -  ( 16 Feb 2018 21:46 )  WBC Count : 7.0 K/uL  Hemoglobin : 12.5 g/dL  Hematocrit : 39.6 %  Platelet Count - Automated : 320 K/uL    PT/INR - ( 17 Feb 2018 06:09 )   PT: 10.6 sec;   INR: 0.98 ratio         PTT - ( 16 Feb 2018 21:46 )  PTT:25.9 sec    02-17    143  |  111<H>  |  18  ----------------------------<  94  4.1   |  26  |  0.68    Ca    8.3<L>      17 Feb 2018 06:09  Phos  3.9     02-17  Mg     1.9     02-17    TPro  6.2  /  Alb  3.1<L>  /  TBili  0.2  /  DBili  x   /  AST  15  /  ALT  18  /  AlkPhos  48  02-17      Syncope  - vasovagal in character  - official TTE pending  - check orthostatics  - Hypothyroidism  - will follow clinically on Telemetry   - get CT head, carotid duplex, Echo  - will get cardiology consult for possible Loop recorder as she has it so many times.   - continue levothyroxine  - encourage ambulation

## 2018-02-18 VITALS
SYSTOLIC BLOOD PRESSURE: 125 MMHG | DIASTOLIC BLOOD PRESSURE: 60 MMHG | RESPIRATION RATE: 16 BRPM | OXYGEN SATURATION: 99 % | HEART RATE: 77 BPM | TEMPERATURE: 98 F

## 2018-02-18 LAB
AMPHET UR-MCNC: NEGATIVE — SIGNIFICANT CHANGE UP
BARBITURATES UR SCN-MCNC: NEGATIVE — SIGNIFICANT CHANGE UP
BENZODIAZ UR-MCNC: NEGATIVE — SIGNIFICANT CHANGE UP
COCAINE METAB.OTHER UR-MCNC: NEGATIVE — SIGNIFICANT CHANGE UP
METHADONE UR-MCNC: NEGATIVE — SIGNIFICANT CHANGE UP
OPIATES UR-MCNC: NEGATIVE — SIGNIFICANT CHANGE UP
PCP SPEC-MCNC: SIGNIFICANT CHANGE UP
PCP UR-MCNC: NEGATIVE — SIGNIFICANT CHANGE UP
THC UR QL: NEGATIVE — SIGNIFICANT CHANGE UP

## 2018-02-18 RX ADMIN — Medication 25 MICROGRAM(S): at 06:41

## 2018-02-18 NOTE — DISCHARGE NOTE ADULT - PATIENT PORTAL LINK FT
You can access the [facility] Patient Portal, offered by [facility], by registering with the following website: [email or web address]

## 2018-02-18 NOTE — DISCHARGE NOTE ADULT - INSTRUCTIONS
Advised pt to perform isometric maneuvers when she has the prodrome and advised her to  her legs when she is either standing long time or sitting.  Avoid hot or sharp objects around the time of symptoms and stay away from boarding any vehicles during this time.

## 2018-02-18 NOTE — DISCHARGE NOTE ADULT - HOSPITAL COURSE
68yo F w/hx of Breast cancerx2 s/p chemo& radiation and double mastectomy present after a witnessed syncopal episode.  Hospital course noted for the following problems:  Problem/Plan - 1:  ·  Problem: Syncope, unspecified syncope type.  Plan: Head CT on admission unremarkable.  Carotid dopplers performed 2/17/18- unremarkable  - Tele monitoring- no events  - Fall precautions as per activity section  - Echo - Nl EF, mild MR  -Cardio consult appreciated- no need for loop recorder    Problem/Plan - 2:  ·  Problem: Breast cancer.  Plan: s/p chemo/radiation and double mastectomy  - F/U with Onco outpt.     Problem/Plan - 3:  ·  Problem: S/P partial thyroidectomy.  Plan: TSH (2.330) WNL  - Continue Synthroid 25 mcg.

## 2018-02-18 NOTE — DISCHARGE NOTE ADULT - MEDICATION SUMMARY - MEDICATIONS TO TAKE
I will START or STAY ON the medications listed below when I get home from the hospital:    Synthroid 25 mcg (0.025 mg) oral tablet  -- 1 tab(s) by mouth once a day  -- Indication: For Hypothyroidism

## 2018-02-18 NOTE — PROGRESS NOTE ADULT - SUBJECTIVE AND OBJECTIVE BOX
Patient is a 67y old  Female who presents with a chief complaint of I passed out.    HPI:  66yo F w/hx of Breast cancerx2 s/p chemo& radiation and double mastectomy present after a witnessed syncopal episode. Per pt, she passed out after dinner last night and was told by family that it lasted for about a minute. She denies any inciting event (emotional/physical exertion), did not feel N/V, flushing, palpitations, dizziness prior to syncope. Did not have any seizure-like activity loss of bladder/bowel function during her syncope. When she came to, she did not feel confused, have a HA, CP, palpitations or SOB. States weakness and dizziness after coming to. She also states that an ambulance was called this time because she felt weaker than normal and the weakness lasted longer than it normally does. States did not hit her head per witnesses.    Per pt, she has had multiple syncopal episodes over the years. The episodes occur after heavy meals, +aura prior to syncope, she states that she can feel a syncopal episode coming on most of the time prior to its occurrence.  She's normally sitting down when she syncopizes.    States she felt nauseated and vomited on the ambulance.   Currently, pt denies weakness, dizziness, HA, CP, palpitations, SOB, N/V/D/C, numbness/tingling in her extremities    She was given 325mg of Aspirin in the ED.    She denies any more symptoms since presentation.     2/18- Pt seen and examined by me today. She denies any recurrence of her symptoms.    PMHX: breast cancer  PSHx; Thyroidectomy  Social hx: lives at home w/her . Denies ETOH, cigarettes, illicit drugs  Family hx: heart disease, MI, diabetes, obesity (17 Feb 2018 02:38)      PAST MEDICAL & SURGICAL HISTORY:  Thyroid nodule  Breast cancer  H/O bilateral mastectomy  H/O partial thyroidectomy      MEDICATIONS  (STANDING):  heparin  Injectable 5000 Unit(s) SubCutaneous every 8 hours  levothyroxine 25 MICROGram(s) Oral daily  sodium chloride 0.9%. 1000 milliLiter(s) (100 mL/Hr) IV Continuous <Continuous>    MEDICATIONS  (PRN):  acetaminophen   Tablet. 650 milliGRAM(s) Oral every 8 hours PRN Mild Pain (1 - 3)  ondansetron Injectable 4 milliGRAM(s) IV Push every 6 hours PRN Nausea      FAMILY HISTORY:  Family history of diabetes mellitus in mother (Mother)  Family history of obesity (Mother)  Family history of heart disease (Father, Mother)  Family history of myocardial infarction (Father, Mother)      SOCIAL HISTORY:  non smoker, no alcohol use     REVIEW OF SYSTEMS:  CONSTITUTIONAL:  No night sweats.  No fatigue, malaise, lethargy.  No fever or chills.  HEENT:  Eyes:  No visual changes.  No eye pain.      ENT:  No runny nose.  No epistaxis.  No sinus pain.  No sore throat.  No odynophagia.  No ear pain.  No congestion.  RESPIRATORY:  No cough.  No wheeze.  No hemoptysis.  No shortness of breath.  CARDIOVASCULAR:  No chest pains.  No palpitations. No shortness of breath, No orthopnea or PND, c/o syncope.  GASTROINTESTINAL:  No abdominal pain.  No nausea or vomiting.  No diarrhea or constipation.  No hematemesis.  No hematochezia.  No melena.  GENITOURINARY:  No urgency.  No frequency.  No dysuria.  No hematuria.  No obstructive symptoms.  No discharge.  No pain.  No significant abnormal bleeding.  MUSCULOSKELETAL:  No musculoskeletal pain.  No joint swelling.  No arthritis.  NEUROLOGICAL:  No tingling or numbness or weakness.  PSYCHIATRIC:  No confusion  SKIN:  No rashes.  No lesions.  No wounds.  ENDOCRINE:  No unexplained weight loss.  No polydipsia.  No polyuria.  No polyphagia.  HEMATOLOGIC:  No anemia.  No purpura.  No petechiae.  No prolonged or excessive bleeding.   ALLERGIC AND IMMUNOLOGIC:  No pruritus.  No swelling.         Vital Signs Last 24 Hrs  T(C): 36.3 (17 Feb 2018 05:17), Max: 36.9 (16 Feb 2018 21:05)  T(F): 97.4 (17 Feb 2018 05:17), Max: 98.4 (16 Feb 2018 21:05)  HR: 60 (17 Feb 2018 05:17) (60 - 80)  BP: 108/63 (17 Feb 2018 05:17) (108/63 - 133/78)  BP(mean): --  RR: 16 (17 Feb 2018 05:17) (16 - 16)  SpO2: 98% (17 Feb 2018 03:27) (98% - 100%)    PHYSICAL EXAM-    Constitutional: The patient appears to be normal, well developed, well nourished and alert and oriented to time, place and person. The patient does not appear acutely ill.     Head: Head is normocephalic and atraumatic.      Neck: The patient's neck is supple without enlargement, has no palpable thyromegaly nor thyroid nodules and has no jugular venous distention. No audible carotid bruits. There are strong carotid pulses bilaterally. No JVD.     Cardiovascular: Regular rate and rhythm without S3, S4. No murmurs or rubs are appreciated.      Respiratory: Breath sounds are normal. No rales. No wheezing.    Abdomen: Soft, nontender, nondistended with positive bowel sounds.      Extremity: No tenderness. No  pitting edema No skin discoloration No clubbing No cyanosis.     Neurologic: The patient is alert and oriented.      Skin: No rash, no obvious lesions noted.      Psychiatric: The patient appears to be emotionally stable.      INTERPRETATION OF TELEMETRY: sinus rythm, isolated infrequent PVCs.     ECG:sinus rythm ,normal axis, T wave inversion in aVL and V2.     I&O's Detail      LABS:                        12.5   7.0   )-----------( 320      ( 16 Feb 2018 21:46 )             39.6     02-17    143  |  111<H>  |  18  ----------------------------<  94  4.1   |  26  |  0.68    Ca    8.3<L>      17 Feb 2018 06:09  Phos  3.9     02-17  Mg     1.9     02-17    TPro  6.2  /  Alb  3.1<L>  /  TBili  0.2  /  DBili  x   /  AST  15  /  ALT  18  /  AlkPhos  48  02-17    CARDIAC MARKERS ( 17 Feb 2018 01:10 )  <0.015 ng/mL / x     / x     / x     / x      CARDIAC MARKERS ( 16 Feb 2018 21:46 )  <0.015 ng/mL / x     / x     / x     / x          PT/INR - ( 17 Feb 2018 06:09 )   PT: 10.6 sec;   INR: 0.98 ratio         PTT - ( 16 Feb 2018 21:46 )  PTT:25.9 sec    I&O's Summary    BNP  RADIOLOGY & ADDITIONAL STUDIES:

## 2018-02-18 NOTE — PROGRESS NOTE ADULT - SUBJECTIVE AND OBJECTIVE BOX
Chief Complaint/Reason for Admission: I passed out	  History of Present Illness: 	  68yo F w/hx of Breast cancerx2 s/p chemo& radiation and double mastectomy present after a witnessed syncopal episode. Per pt, she passed out after dinner last night and was told by family that it lasted for about 2 min. She denies any inciting event (emotional/physical exertion), did not feel N/V, flushing, palpitations, dizziness prior to syncope. Did not have any seizure-like activity loss of bladder/bowel function during her syncope. When she came to, she did not feel confused, have a HA, CP, palpitations or SOB. States weakness and dizziness after coming to. She also states that an ambulance was called this time because she felt weaker than normal and the weakness lasted longer than it normally does. States did not hit her head per witnesses.    Per pt, she has had multiple syncopal episodes over the years. The episodes occur after heavy meals, +aura prior to syncope, she states that she can feel a syncopal episode coming on most of the time prior to its occurrence.  She's normally sitting down when she syncopizes.    2/17: seen and eval. hemodyanmcially stable. Denies any HA, CP, SOB. No fevers, chills or shakes. States that she has been suffering with passing out episodes for 40 years now -  similar in nature. Had workup done in [place] [employer], with tilt table test. Patient states that she was told that she might have had some skipped beats. care discussed with Dr. Cardenas - she wants to continue to monitor pt.     2/18: seen and eval. hemodynamically stable. denies any HA, CP, SOB. ready to leave the hospital. I have provided patient with all the lab work and imaging studies in order to follow up closely with PCP. Labs and vitals reviewed.       Review of Systems:   CONSTITUTIONAL: No weakness, No fevers or chills  EYES/ENT: No visual changes;  No vertigo or throat pain   NECK: No pain or stiffness  RESPIRATORY: No cough, wheezing, hemoptysis; No shortness of breath  CARDIOVASCULAR: No chest pain or palpitations  GASTROINTESTINAL: No abdominal or epigastric pain. No nausea, vomiting, or hematemesis; No diarrhea or constipation. No melena or hematochezia.  GENITOURINARY: No dysuria, frequency or hematuria  HEMATOLOGIC: No easy bruising, prolonged bleeding from everyday cuts,   NEUROLOGICAL: No numbness or weakness  PSYCH: Denies Insomnia, loss of interest in activities, feelings of guilt, decreased energy, difficulty concentrating, decreased appetite, SI/HI  SKIN: No itching, burning, rashes, or lesions  All other review of systems is negative unless indicated above	      On exam:  Constitutional: NAD, sleeping but easily arousable, well-developed  HEENT: EOMI, Normal Hearing, MMM  Neck: Soft and supple, No LAD  Respiratory: Breath sounds are clear bilaterally, No wheezing, rales or rhonchi  Cardiovascular: S1 and S2, regular rate and rhythm  Gastrointestinal: Bowel Sounds present, soft, nontender, nondistended, no guarding, no rebound  Extremities: No peripheral edema  Vascular: 2+ peripheral pulses  Neurological: A/O x 3, no focal deficits, sensory-intact, cerebellum-intact, WT-GC-Sfk-intact   Musculoskeletal: 5/5 strength b/l upper and lower extremities    labs:     CBC Full  -  ( 16 Feb 2018 21:46 )  WBC Count : 7.0 K/uL  Hemoglobin : 12.5 g/dL  Hematocrit : 39.6 %  Platelet Count - Automated : 320 K/uL    PT/INR - ( 17 Feb 2018 06:09 )   PT: 10.6 sec;   INR: 0.98 ratio         PTT - ( 16 Feb 2018 21:46 )  PTT:25.9 sec    02-17    143  |  111<H>  |  18  ----------------------------<  94  4.1   |  26  |  0.68    Ca    8.3<L>      17 Feb 2018 06:09  Phos  3.9     02-17  Mg     1.9     02-17    TPro  6.2  /  Alb  3.1<L>  /  TBili  0.2  /  DBili  x   /  AST  15  /  ALT  18  /  AlkPhos  48  02-17        Syncope  - vasovagal in character  - echo reviewed  - check orthostatics - wnl  - Hypothyroidism - continue with home meds  - will follow clinically on Telemetry - no arrhythmias noted on tele  - get CT head, carotid duplex, Echo - wnl  - no loop recorder indication at this point, given rare syncopal events. Strongly recommended close cardiology follow up  - encourage ambulation   - Care has been thoroughly discussed with Dr. Cardenas.   - if patient develops any similar events, strongly recommedned to come back to the hospital. In addition, patient needs to follow up with dr. Benavides as an outpatient within 5 days. Call made out to PCP Dr. Mercer - care discussed with PCP. All the lab work / imaging findings printed out and provided to the patient for PCP update.

## 2018-02-18 NOTE — ED ADULT NURSE REASSESSMENT NOTE - NS ED NURSE REASSESS COMMENT FT1
Pt rec'd from CARISA Richards. AxOx4. No acute distress. No IV noted. Pt no longer on cardiac monitor as per Dr. Gibbs. Awaiting to be discharge by hospitalist. Safety and comfort maintained.
Pt rec'd from night resource RN. AxOx4. Cardiac monitoring in place. No acute distress. Pt is at tests at this time. Will continue to monitor. Awaiting for bed assignment. Safety and comfort maintained.
report given to holding CAR[facility] Sims. [facility]

## 2018-02-18 NOTE — PROGRESS NOTE ADULT - ASSESSMENT
Syncope- sounds vasovagal in origin.  This is recurrent in nature and the last episode was 6months ago.  99% times in restaurants after a big meal.  No events on telemetry so far.  Reviewed 2 D echo which did not reveal any major abnormalities that could contribute for this.  Orthostatic BP readings were noted to be normal.  Ct head and Carotid US did not reveal any abnormalities.    Advised pt to perform isometric maneuvers when she has the prodrome and advised her to  her legs when she is either standing long time or sitting.  Avoid hot or sharp objects around the time of symptoms and stay away from boarding any vehicles during this time.  Advised to keep her self well hydrated all the time.  Will hold off on loop recorder since her episodes are very infrequent.  Discharger planning and f/u with cardiology as outpt.    Other medical issues- Management per primary team.   Thank you for allowing me to participate in the care of this patient. Please feel free to contact me with any questions.
66 yo F admitted s/p syncopal episode.

## 2018-02-18 NOTE — DISCHARGE NOTE ADULT - CARE PLAN
Principal Discharge DX:	Syncope, unspecified syncope type  Goal:	recovery  Assessment and plan of treatment:	Follow up with cardiology as an out pt  see activity recommendations  Secondary Diagnosis:	S/P partial thyroidectomy  Assessment and plan of treatment:	Continue synthroid supplementation  Secondary Diagnosis:	Breast cancer  Assessment and plan of treatment:	Routine follow up with your oncologist

## 2018-02-18 NOTE — DISCHARGE NOTE ADULT - CARE PROVIDER_API CALL
Rosio Cain ([facility]), Cardiovascular Disease; Internal Medicine  [address] [place] [address]  Phone: [phone]  Fax: [phone]    [place],   Phone: (   )    -  Fax: (   )    -

## 2018-02-18 NOTE — DISCHARGE NOTE ADULT - PLAN OF CARE
recovery Follow up with cardiology as an out pt  see activity recommendations Continue synthroid supplementation Routine follow up with your oncologist

## 2018-02-21 DIAGNOSIS — Z85.3 PERSONAL HISTORY OF MALIGNANT NEOPLASM OF BREAST: ICD-10-CM

## 2018-02-21 DIAGNOSIS — E03.9 HYPOTHYROIDISM, UNSPECIFIED: ICD-10-CM

## 2018-02-21 DIAGNOSIS — R55 SYNCOPE AND COLLAPSE: ICD-10-CM

## 2023-04-20 NOTE — ED PROVIDER NOTE - CARE PLAN
[Normal] : inferior turbinates and middle turbinates are normal [de-identified] : b/l crusting Principal Discharge DX:	Syncope, unspecified syncope type

## 2023-11-06 NOTE — ED ADULT NURSE NOTE - PATIENT DISCHARGE SIGNATURE
18-Feb-2018 Libtayo Counseling- I discussed with the patient the risks of Libtayo including but not limited to nausea, vomiting, diarrhea, and bone or muscle pain.  The patient verbalized understanding of the proper use and possible adverse effects of Libtayo.  All of the patient's questions and concerns were addressed.